# Patient Record
Sex: FEMALE | Race: WHITE | NOT HISPANIC OR LATINO | URBAN - METROPOLITAN AREA
[De-identification: names, ages, dates, MRNs, and addresses within clinical notes are randomized per-mention and may not be internally consistent; named-entity substitution may affect disease eponyms.]

---

## 2024-12-05 ENCOUNTER — OFFICE VISIT (OUTPATIENT)
Dept: URGENT CARE | Facility: CLINIC | Age: 34
End: 2024-12-05
Payer: COMMERCIAL

## 2024-12-05 VITALS
SYSTOLIC BLOOD PRESSURE: 131 MMHG | HEART RATE: 104 BPM | DIASTOLIC BLOOD PRESSURE: 96 MMHG | TEMPERATURE: 98.6 F | OXYGEN SATURATION: 96 %

## 2024-12-05 DIAGNOSIS — J22 ACUTE LOWER RESPIRATORY INFECTION: Primary | ICD-10-CM

## 2024-12-05 PROCEDURE — 99203 OFFICE O/P NEW LOW 30 MIN: CPT | Performed by: PHYSICIAN ASSISTANT

## 2024-12-05 RX ORDER — PREDNISONE 20 MG/1
TABLET ORAL
Qty: 24 TABLET | Refills: 0 | Status: SHIPPED | OUTPATIENT
Start: 2024-12-05 | End: 2024-12-05

## 2024-12-05 RX ORDER — PREDNISONE 20 MG/1
TABLET ORAL
Qty: 8 TABLET | Refills: 0 | Status: SHIPPED | OUTPATIENT
Start: 2024-12-05 | End: 2024-12-05 | Stop reason: ENTERED-IN-ERROR

## 2024-12-05 RX ORDER — PREDNISONE 20 MG/1
TABLET ORAL
Qty: 12 TABLET | Refills: 0 | Status: SHIPPED | OUTPATIENT
Start: 2024-12-05 | End: 2024-12-11

## 2024-12-05 RX ORDER — AZITHROMYCIN 250 MG/1
TABLET, FILM COATED ORAL
Qty: 6 TABLET | Refills: 0 | Status: SHIPPED | OUTPATIENT
Start: 2024-12-05

## 2024-12-05 NOTE — PATIENT INSTRUCTIONS
Medications safe to use in breastfeeding  It is recommended to wait around 4 hours after your dose to nurse in the first couple of days due to high doses, but no adverse effects have been shown if nursing before the 4 hour connor if it is needed   Can continue with Robitussin and Mucinex for cough - these would be the safest options to use

## 2024-12-05 NOTE — LETTER
December 5, 2024     Patient: Tyesha Bertrand   YOB: 1990   Date of Visit: 12/5/2024       To Whom It May Concern:    Tyesha Bertrand was seen in my clinic on 12/5/2024 at 9:15 am. Please excuse Tyesha for her absence from work on this day to make the appointment. She should be excused 12/5-12/6/24 and can return 12/9/24.     If you have any questions or concerns, please don't hesitate to call.         Sincerely,         Tori Silver PA-C        CC: No Recipients

## 2024-12-05 NOTE — PROGRESS NOTES
Subjective   Patient ID: Tyesha Bertrand is a 34 y.o. female.    She has been sick x 10 days. Child had PNA. Went through 2 bottles of Mucinex, 3 bottles of Robitussin, and a bottle of Equate version of Mucinex. Sx are mostly in the chest, she has urinated from coughing so hard as well. Denies any fever, chills, headache, dizziness, CP, SOB, abdominal pain, N/V/D, rash, swelling, bruising, ear pain, loss of sense of taste/smell.     History provided by:  Patient    Review of Systems   All other systems reviewed and are negative.    Objective     Physical Exam  Vitals reviewed.   Constitutional:       General: She is awake.      Appearance: Normal appearance. She is well-developed.   HENT:      Head: Normocephalic and atraumatic.      Right Ear: Tympanic membrane and ear canal normal.      Left Ear: Tympanic membrane and ear canal normal.      Nose: Nose normal.      Mouth/Throat:      Lips: Pink.      Mouth: Mucous membranes are moist.      Pharynx: Oropharynx is clear.   Cardiovascular:      Rate and Rhythm: Normal rate and regular rhythm.   Pulmonary:      Effort: Pulmonary effort is normal.      Breath sounds: Decreased air movement present. Decreased breath sounds, wheezing and rhonchi present. No rales.      Comments: Inspiratory and expiratory wheezes and rhonchi.  Musculoskeletal:      Cervical back: Full passive range of motion without pain.      Right lower leg: No edema.      Left lower leg: No edema.   Skin:     General: Skin is warm and dry.      Findings: No lesion or rash.   Neurological:      General: No focal deficit present.      Mental Status: She is alert and oriented to person, place, and time.      Cranial Nerves: No facial asymmetry.      Motor: Motor function is intact.      Gait: Gait is intact.   Psychiatric:         Attention and Perception: Attention normal.         Mood and Affect: Mood and affect normal.       Assessment/Plan   Problem List Items Addressed This Visit    None  Visit  Diagnoses         Codes    Acute lower respiratory infection    -  Primary J22    Relevant Medications    azithromycin (Zithromax) 250 mg tablet    predniSONE (Deltasone) 20 mg tablet          - Increase rest and fluids   - Medications Rx as above, please take as prescribed and with food and probiotic/Activia yogurt to promote good gut bacteria and avoid GI upset that may come with taking antibiotics   - If any chest pain or difficulty breathing, please go to ER   - If no improvement, please follow up with a PCP. If you need a referral to one, please call our office.    Red flag symptoms reviewed with patient and all questions answered. Patient or parent/guardian verbalized understanding and agreement with care plan as above. All in office testing reviewed with patient. If symptoms worsen or do not improve, patient is to follow up with PCP or report to the ER.      Patient disposition: Home

## 2024-12-05 NOTE — PROGRESS NOTES
Albuterol - states that she can control her asthma, but needed it for the first time in 1-2 years ago

## 2024-12-06 ENCOUNTER — TELEPHONE (OUTPATIENT)
Dept: URGENT CARE | Facility: CLINIC | Age: 34
End: 2024-12-06
Payer: COMMERCIAL

## 2024-12-06 RX ORDER — ALBUTEROL SULFATE 90 UG/1
2 INHALANT RESPIRATORY (INHALATION) EVERY 6 HOURS PRN
Qty: 18 G | Refills: 0 | Status: SHIPPED | OUTPATIENT
Start: 2024-12-06 | End: 2025-01-05

## 2024-12-28 DIAGNOSIS — J22 ACUTE LOWER RESPIRATORY INFECTION: ICD-10-CM

## 2024-12-29 RX ORDER — ALBUTEROL SULFATE 90 UG/1
2 INHALANT RESPIRATORY (INHALATION) EVERY 6 HOURS PRN
OUTPATIENT
Start: 2024-12-29 | End: 2025-01-28